# Patient Record
Sex: MALE | Race: WHITE
[De-identification: names, ages, dates, MRNs, and addresses within clinical notes are randomized per-mention and may not be internally consistent; named-entity substitution may affect disease eponyms.]

---

## 2018-09-03 ENCOUNTER — HOSPITAL ENCOUNTER (EMERGENCY)
Dept: HOSPITAL 97 - ER | Age: 18
Discharge: HOME | End: 2018-09-03
Payer: SELF-PAY
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
## 2018-09-03 DIAGNOSIS — R07.89: Primary | ICD-10-CM

## 2018-09-03 DIAGNOSIS — Q61.3: ICD-10-CM
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
## 2018-09-03 DIAGNOSIS — S20.212A: ICD-10-CM

## 2018-09-03 LAB
ALBUMIN SERPL BCP-MCNC: 4.2 G/DL (ref 3.4–5)
ALP SERPL-CCNC: 74 U/L (ref 45–117)
ALT SERPL W P-5'-P-CCNC: 21 U/L (ref 12–78)
AST SERPL W P-5'-P-CCNC: 14 U/L (ref 15–37)
BUN BLD-MCNC: 15 MG/DL (ref 7–18)
GLUCOSE SERPLBLD-MCNC: 99 MG/DL (ref 74–106)
HCT VFR BLD CALC: 46.4 % (ref 39.6–49)
LIPASE SERPL-CCNC: 103 U/L (ref 73–393)
LYMPHOCYTES # SPEC AUTO: 2.4 K/UL (ref 0.7–4.9)
MCH RBC QN AUTO: 29 PG (ref 27–35)
MCV RBC: 84.9 FL (ref 80–100)
METHAMPHET UR QL SCN: NEGATIVE
PMV BLD: 11.2 FL (ref 7.6–11.3)
POTASSIUM SERPL-SCNC: 3.5 MMOL/L (ref 3.5–5.1)
RBC # BLD: 5.47 M/UL (ref 4.33–5.43)
THC SERPL-MCNC: NEGATIVE NG/ML

## 2018-09-03 PROCEDURE — 83690 ASSAY OF LIPASE: CPT

## 2018-09-03 PROCEDURE — 99291 CRITICAL CARE FIRST HOUR: CPT

## 2018-09-03 PROCEDURE — 96374 THER/PROPH/DIAG INJ IV PUSH: CPT

## 2018-09-03 PROCEDURE — 80048 BASIC METABOLIC PNL TOTAL CA: CPT

## 2018-09-03 PROCEDURE — 36415 COLL VENOUS BLD VENIPUNCTURE: CPT

## 2018-09-03 PROCEDURE — 80320 DRUG SCREEN QUANTALCOHOLS: CPT

## 2018-09-03 PROCEDURE — 72125 CT NECK SPINE W/O DYE: CPT

## 2018-09-03 PROCEDURE — 86900 BLOOD TYPING SEROLOGIC ABO: CPT

## 2018-09-03 PROCEDURE — 80307 DRUG TEST PRSMV CHEM ANLYZR: CPT

## 2018-09-03 PROCEDURE — 96361 HYDRATE IV INFUSION ADD-ON: CPT

## 2018-09-03 PROCEDURE — 80076 HEPATIC FUNCTION PANEL: CPT

## 2018-09-03 PROCEDURE — 96375 TX/PRO/DX INJ NEW DRUG ADDON: CPT

## 2018-09-03 PROCEDURE — 74177 CT ABD & PELVIS W/CONTRAST: CPT

## 2018-09-03 PROCEDURE — 86850 RBC ANTIBODY SCREEN: CPT

## 2018-09-03 PROCEDURE — 71260 CT THORAX DX C+: CPT

## 2018-09-03 PROCEDURE — 81003 URINALYSIS AUTO W/O SCOPE: CPT

## 2018-09-03 PROCEDURE — 86901 BLOOD TYPING SEROLOGIC RH(D): CPT

## 2018-09-03 PROCEDURE — 99292 CRITICAL CARE ADDL 30 MIN: CPT

## 2018-09-03 PROCEDURE — 85025 COMPLETE CBC W/AUTO DIFF WBC: CPT

## 2018-09-03 PROCEDURE — 70450 CT HEAD/BRAIN W/O DYE: CPT
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
## 2019-03-07 ENCOUNTER — HOSPITAL ENCOUNTER (EMERGENCY)
Dept: HOSPITAL 97 - ER | Age: 19
Discharge: HOME | End: 2019-03-07
Payer: SELF-PAY

## 2019-03-07 DIAGNOSIS — I10: ICD-10-CM

## 2019-03-07 DIAGNOSIS — S39.92XA: Primary | ICD-10-CM
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
## 2019-03-07 LAB
BUN BLD-MCNC: 22 MG/DL (ref 7–18)
GLUCOSE SERPLBLD-MCNC: 92 MG/DL (ref 74–106)
HCT VFR BLD CALC: 46 % (ref 39.6–49)
LYMPHOCYTES # SPEC AUTO: 2.4 K/UL (ref 0.7–4.9)
PMV BLD: 11.5 FL (ref 7.6–11.3)
POTASSIUM SERPL-SCNC: 3.6 MMOL/L (ref 3.5–5.1)
RBC # BLD: 5.33 M/UL (ref 4.33–5.43)

## 2019-03-07 PROCEDURE — 80048 BASIC METABOLIC PNL TOTAL CA: CPT

## 2019-03-07 PROCEDURE — 86901 BLOOD TYPING SEROLOGIC RH(D): CPT

## 2019-03-07 PROCEDURE — 99284 EMERGENCY DEPT VISIT MOD MDM: CPT

## 2019-03-07 PROCEDURE — 85025 COMPLETE CBC W/AUTO DIFF WBC: CPT

## 2019-03-07 PROCEDURE — 96374 THER/PROPH/DIAG INJ IV PUSH: CPT

## 2019-03-07 PROCEDURE — 72125 CT NECK SPINE W/O DYE: CPT

## 2019-03-07 PROCEDURE — 71260 CT THORAX DX C+: CPT

## 2019-03-07 PROCEDURE — 36415 COLL VENOUS BLD VENIPUNCTURE: CPT

## 2019-03-07 PROCEDURE — 74177 CT ABD & PELVIS W/CONTRAST: CPT

## 2019-03-07 PROCEDURE — 86900 BLOOD TYPING SEROLOGIC ABO: CPT

## 2019-03-07 PROCEDURE — 70450 CT HEAD/BRAIN W/O DYE: CPT

## 2019-03-07 PROCEDURE — 86850 RBC ANTIBODY SCREEN: CPT
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
## 2019-09-02 ENCOUNTER — HOSPITAL ENCOUNTER (EMERGENCY)
Dept: HOSPITAL 97 - ER | Age: 19
Discharge: HOME | End: 2019-09-02
Payer: SELF-PAY
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
## 2019-09-02 DIAGNOSIS — I10: ICD-10-CM

## 2019-09-02 DIAGNOSIS — S62.356A: Primary | ICD-10-CM
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
## 2019-09-02 PROCEDURE — 2W3JX1Z IMMOBILIZATION OF RIGHT FINGER USING SPLINT: ICD-10-PCS

## 2019-09-02 PROCEDURE — 99283 EMERGENCY DEPT VISIT LOW MDM: CPT

## 2019-09-02 NOTE — ER
Nurse's Notes                                                                                     

 [facility]                                                                 

Name: Bryson Kumar                                                                                

Age: 19 yrs                                                                                       

Sex: Male                                                                                         

: 2000                                                                                   

MRN: [id number]                                                                                   

Arrival Date: 2019                                                                          

Time: 17:48                                                                                       

Account#: [id number]                                                                            

Bed 6                                                                                             

Private MD:                                                                                       

Diagnosis: Nondisplaced fracture of shaft of fifth metacarpal bone, right hand                    

                                                                                                  

Presentation:                                                                                     

                                                                                             

17:49 Presenting complaint: Patient states: right hand injury yesterday after punching a      sv  

      wall. Transition of care: patient was not received from another setting of care. Onset      

      of symptoms was 2019. Risk Assessment: Do you want to hurt yourself or         

      someone else? Patient reports no desire to harm self or others. Initial Sepsis Screen:      

      Does the patient meet any 2 criteria? No. Patient's initial sepsis screen is negative.      

      Does the patient have a suspected source of infection? No. Patient's initial sepsis         

      screen is negative. Care prior to arrival: None.                                            

17:49 Method Of Arrival: Ambulatory                                                           sv  

17:49 Acuity: MINERVA 4                                                                           sv  

                                                                                                  

Triage Assessment:                                                                                

17:49 General: Appears in no apparent distress. uncomfortable, Behavior is calm, cooperative, sv  

      appropriate for age. Pain: Complains of pain in right hand. Neuro: Level of                 

      Consciousness is awake, alert, obeys commands, Oriented to person, place, time,             

      situation, Gait is steady. Respiratory: Respiratory effort is even, unlabored.              

      Musculoskeletal: Range of motion: intact in all extremities.                                

18:00 Injury Description: Bruise sustained to right hand.                                     iw  

                                                                                                  

Historical:                                                                                       

- Allergies:                                                                                      

17:50 No Known Drug Allergies;                                                                sv  

- PMHx:                                                                                           

17:50 Hypertension; POLYCYSTIC KIDNEY DISEASE;                                                sv  

- PSHx:                                                                                           

17:50 None;                                                                                   sv  

                                                                                                  

- Immunization history:: Adult Immunizations up to date.                                          

- Social history:: Smoking status: Patient/guardian denies using tobacco.                         

- Ebola Screening: : No symptoms or risks identified at this time.                                

                                                                                                  

                                                                                                  

Screenin:00 Abuse screen: Denies threats or abuse. Denies injuries from another. Nutritional        iw  

      screening: No deficits noted. Tuberculosis screening: No symptoms or risk factors           

      identified. Fall Risk None identified.                                                      

                                                                                                  

Assessment:                                                                                       

18:00 General: Appears in no apparent distress. Behavior is calm, cooperative. Pain:          iw  

      Complains of pain in right hand. Neuro: Level of Consciousness is awake, alert, obeys       

      commands, Oriented to person, place, time, situation, Moves all extremities.                

      Cardiovascular: Patient's skin is warm and dry. Respiratory: Respiratory effort is          

      even, unlabored, Respiratory pattern is regular. Derm: Skin is intact, is healthy with      

      good turgor. Musculoskeletal: Range of motion: limited in right hand.                       

                                                                                                  

Vital Signs:                                                                                      

17:50  / 77; Pulse 98; Resp 16; Temp 98.8; Pulse Ox 98% ; Weight 104.33 kg; Height 6    sv  

      ft. 3 in. (190.50 cm); Pain 8/10;                                                           

17:50 Body Mass Index 28.75 (104.33 kg, 190.50 cm)                                            sv  

                                                                                                  

ED Course:                                                                                        

17:48 Patient arrived in ED.                                                                  as  

17:49 Triage completed.                                                                       sv  

17:50 Arm band placed on.                                                                     sv  

17:56 Uriel Parr PA is PHCP.                                                               [id number] 

17:57 Justin Pate MD is Attending Physician.                                             [id number] 

17:59 Yaneth Henao, RN is Primary Nurse.                                                   iw  

18:00 Patient has correct armband on for positive identification.                             iw  

18:10 Hand Right 3 View XRAY In Process Unspecified.                                          EDMS

18:36 Johnson Gutierrez MD is Referral Physician.                                                [id number] 

18:38 Orthoglass splint: Ulnar gutter/Boxer splint applied on right forearm.                  em1 

18:55 No provider procedures requiring assistance completed. Patient did not have IV access   iw  

      during this emergency room visit.                                                           

                                                                                                  

Administered Medications:                                                                         

No medications were administered                                                                  

                                                                                                  

                                                                                                  

Outcome:                                                                                          

18:38 Discharge ordered by MD.                                                                [id number] 

18:55 Discharged to home ambulatory.                                                          iw  

18:55 Condition: good                                                                             

18:55 Discharge instructions given to patient, Instructed on discharge instructions, follow       

      up and referral plans. medication usage, Demonstrated understanding of instructions,        

      follow-up care, medications, Prescriptions given X 1.                                       

18:56 Patient left the ED.                                                                    iw  

                                                                                                  

Signatures:                                                                                       

Dispatcher [employer]                           EDMS                                                 

Reyna Goncalves RN RN sv Martinez, Amelia as Williams, Irene, RN RN                                                      

Abelino Slaughter                               em1                                                  

Uriel Parr PA                        PA   [id number]                                                  

                                                                                                  

**************************************************************************************************

## 2019-09-02 NOTE — RAD REPORT
EXAM DESCRIPTION:  RAD - Hand Right 3 View - 9/2/2019 6:09 pm

 

CLINICAL HISTORY:  Right hand pain following blunt force trauma

 

COMPARISON:  None.

 

FINDINGS:  Transverse fractures present midshaft fifth metacarpal. There is no distraction. Patient d
oes show 25 degrees ventral angulation deformity.

 

 No other fracture changes seen. No dislocation or periosteal reaction. Soft tissue swelling is prese
nt without foreign body.

 

IMPRESSION:  Right fifth metacarpal midshaft fracture with 25 degrees ventral angulation.

## 2019-09-02 NOTE — EDPHYS
Physician Documentation                                                                           

 [facility]                                                                 

Name: Bryson Kumar                                                                                

Age: 19 yrs                                                                                       

Sex: Male                                                                                         

: 2000                                                                                   

MRN: [id number]                                                                                   

Arrival Date: 2019                                                                          

Time: 17:48                                                                                       

Account#: [id number]                                                                            

Bed 6                                                                                             

Private MD:                                                                                       

ED Physician Justin Pate                                                                      

HPI:                                                                                              

                                                                                             

18:28 This 19 yrs old  Male presents to ER via Ambulatory with complaints of Hand    jr8 

      Injury.                                                                                     

18:28 The patient or guardian reports decreased range of motion, pain, swelling, tenderness.  jr8 

      The complaints affect the right 5th metacarpal region. Context: The problem was             

      sustained at home, resulted from using own fist to strike, a wall. Onset: The               

      symptoms/episode began/occurred acutely, today. Modifying factors: The symptoms are         

      alleviated by nothing, the symptoms are aggravated by movement. Associated signs and        

      symptoms: The patient has no apparent associated signs or symptoms. Severity of             

      symptoms: At their worst the symptoms were mild, in the emergency department the            

      symptoms are unchanged. The patient has not experienced similar symptoms in the past.       

      The patient has not recently seen a physician.                                              

                                                                                                  

Historical:                                                                                       

- Allergies:                                                                                      

17:50 No Known Drug Allergies;                                                                sv  

- PMHx:                                                                                           

17:50 Hypertension; POLYCYSTIC KIDNEY DISEASE;                                                sv  

- PSHx:                                                                                           

17:50 None;                                                                                   sv  

                                                                                                  

- Immunization history:: Adult Immunizations up to date.                                          

- Social history:: Smoking status: Patient/guardian denies using tobacco.                         

- Ebola Screening: : No symptoms or risks identified at this time.                                

                                                                                                  

                                                                                                  

ROS:                                                                                              

18:28 Eyes: Negative for injury, pain, redness, and discharge, ENT: Negative for injury,      jr8 

      pain, and discharge, Neck: Negative for injury, pain, and swelling, Cardiovascular:         

      Negative for chest pain, palpitations, and edema, Respiratory: Negative for shortness       

      of breath, cough, wheezing, and pleuritic chest pain, Abdomen/GI: Negative for              

      abdominal pain, nausea, vomiting, diarrhea, and constipation, Back: Negative for injury     

      and pain, Skin: Negative for injury, rash, and discoloration, Neuro: Negative for           

      headache, weakness, numbness, tingling, and seizure.                                        

18:28 MS/extremity: Positive for decreased range of motion, ecchymosis, pain, swelling,           

      tenderness, of the right hand.                                                              

                                                                                                  

Exam:                                                                                             

18:28 Eyes:  Pupils equal round and reactive to light, extra-ocular motions intact.  Lids and jr8 

      lashes normal.  Conjunctiva and sclera are non-icteric and not injected.  Cornea within     

      normal limits.  Periorbital areas with no swelling, redness, or edema. ENT:  Nares          

      patent. No nasal discharge, no septal abnormalities noted.  Tympanic membranes are          

      normal and external auditory canals are clear.  Oropharynx with no redness, swelling,       

      or masses, exudates, or evidence of obstruction, uvula midline.  Mucous membranes           

      moist. Neck:  Trachea midline, no thyromegaly or masses palpated, and no cervical           

      lymphadenopathy.  Supple, full range of motion without nuchal rigidity, or vertebral        

      point tenderness.  No Meningismus. Cardiovascular:  Regular rate and rhythm with a          

      normal S1 and S2.  No gallops, murmurs, or rubs.  Normal PMI, no JVD.  No pulse             

      deficits. Respiratory:  Lungs have equal breath sounds bilaterally, clear to                

      auscultation and percussion.  No rales, rhonchi or wheezes noted.  No increased work of     

      breathing, no retractions or nasal flaring. Abdomen/GI:  Soft, non-tender, with normal      

      bowel sounds.  No distension or tympany.  No guarding or rebound.  No evidence of           

      tenderness throughout. Back:  No spinal tenderness.  No costovertebral tenderness.          

      Full range of motion. Skin:  Warm, dry with normal turgor.  Normal color with no            

      rashes, no lesions, and no evidence of cellulitis. Neuro:  Awake and alert, GCS 15,         

      oriented to person, place, time, and situation.  Cranial nerves II-XII grossly intact.      

      Motor strength 5/5 in all extremities.  Sensory grossly intact.  Cerebellar exam            

      normal.  Normal gait.                                                                       

18:28 Musculoskeletal/extremity: Extremities: grossly normal except: noted in the right hand:     

      Patient has swelling, tenderness, and hematoma formation noted over the MCP of the 4th      

      and 5th digits , ROM: intact in all extremities, full active range of motion, full          

      passive range of motion, limited active range of motion due to pain, limited passive        

      range of motion due to pain, Circulation is intact in all extremities. Sensation            

      intact.                                                                                     

                                                                                                  

Vital Signs:                                                                                      

17:50  / 77; Pulse 98; Resp 16; Temp 98.8; Pulse Ox 98% ; Weight 104.33 kg; Height 6    sv  

      ft. 3 in. (190.50 cm); Pain 8/10;                                                           

17:50 Body Mass Index 28.75 (104.33 kg, 190.50 cm)                                              

                                                                                                  

Procedures:                                                                                       

18:28 Splinting: Splint applied to right hand using Orthoglass splint, applied by tech.       jr8 

      Examined by me, post splint application: neurovascular intact, 2+ distal pulses             

      palpable, brisk capillary refill noted, Patient tolerated well.                             

                                                                                                  

MDM:                                                                                              

17:57 Patient medically screened.                                                             jr8 

18:28 Data reviewed: vital signs, nurses notes, radiologic studies, plain films. Data         jr8 

      interpreted: Pulse oximetry: on room air is 98 %. Interpretation: normal. Counseling: I     

      had a detailed discussion with the patient and/or guardian regarding: the historical        

      points, exam findings, and any diagnostic results supporting the discharge/admit            

      diagnosis, radiology results, the need for outpatient follow up, a orthopedic surgeon,      

      to return to the emergency department if symptoms worsen or persist or if there are any     

      questions or concerns that arise at home.                                                   

                                                                                                  

                                                                                             

17:52 Order name: Hand Right 3 View XRAY; Complete Time: 18:25                                  

                                                                                             

18:36 Order name: Splint; Complete Time: 18:38                                                jr8 

                                                                                                  

Administered Medications:                                                                         

No medications were administered                                                                  

                                                                                                  

                                                                                                  

Disposition:                                                                                      

                                                                                             

07:29 Co-signature as Attending Physician, Justin Pate MD I agree with the assessment and  robbie 

      plan of care.                                                                               

                                                                                                  

Disposition:                                                                                      

19 18:38 Discharged to Home. Impression: Nondisplaced fracture of shaft of fifth            

  metacarpal bone, right hand.                                                                    

- Condition is Stable.                                                                            

- Discharge Instructions: Metacarpal Fracture.                                                    

- Prescriptions for Tylenol- Codeine #3 300-30 mg Oral Tablet - take 2 tablets by ORAL            

  route every 6 hours As needed; 20 tablet.                                                       

- Medication Reconciliation Form, Thank You Letter, Antibiotic Education, Prescription            

  Opioid Use form.                                                                                

- Follow up: Johnson Gutierrez MD; When: 2 - 3 days; Reason: Recheck today's complaints,             

  Continuance of care, Re-evaluation by your physician.                                           

- Problem is new.                                                                                 

- Symptoms have improved.                                                                         

                                                                                                  

                                                                                                  

                                                                                                  

Signatures:                                                                                       

Dispatcher [employer]                           Reyna Singh, Justin Moon RN, MD MD cha Williams, Irene, RN RN iw Roszak, Josh, PA PA   jr8                                                  

                                                                                                  

Corrections: (The following items were deleted from the chart)                                    

                                                                                             

18:56 18:38 2019 18:38 Discharged to Home. Impression: Nondisplaced fracture of shaft   iw  

      of fifth metacarpal bone, right hand. Condition is Stable. Forms are Medication             

      Reconciliation Form, Thank You Letter, Antibiotic Education, Prescription Opioid Use.       

      Follow up: Dr. Johnson Gutierrez; When: 2 - 3 days; Reason: Recheck today's complaints,          

      Continuance of care, Re-evaluation by your physician. Problem is new. Symptoms have         

      improved. jr8                                                                               

                                                                                                  

**************************************************************************************************

## 2020-06-15 ENCOUNTER — HOSPITAL ENCOUNTER (EMERGENCY)
Dept: HOSPITAL 97 - ER | Age: 20
Discharge: HOME | End: 2020-06-15
Payer: COMMERCIAL

## 2020-06-15 VITALS — TEMPERATURE: 98.3 F | OXYGEN SATURATION: 98 % | DIASTOLIC BLOOD PRESSURE: 88 MMHG | SYSTOLIC BLOOD PRESSURE: 134 MMHG

## 2020-06-15 DIAGNOSIS — R51: Primary | ICD-10-CM

## 2020-06-15 DIAGNOSIS — Z88.6: ICD-10-CM

## 2020-06-15 DIAGNOSIS — I10: ICD-10-CM

## 2020-06-15 PROCEDURE — 96375 TX/PRO/DX INJ NEW DRUG ADDON: CPT

## 2020-06-15 PROCEDURE — 99283 EMERGENCY DEPT VISIT LOW MDM: CPT

## 2020-06-15 PROCEDURE — 96374 THER/PROPH/DIAG INJ IV PUSH: CPT

## 2020-06-15 NOTE — ER
Nurse's Notes                                                                                     

 [facility]                                                                 

Name: Bryson Kumar                                                                                

Age: 19 yrs                                                                                       

Sex: Male                                                                                         

: 2000                                                                                   

MRN: [id number]                                                                                   

Arrival Date: 06/15/2020                                                                          

Time: 09:09                                                                                       

Account#: [id number]                                                                            

Bed 23                                                                                            

Private MD:                                                                                       

Diagnosis: Headache                                                                               

                                                                                                  

Presentation:                                                                                     

06/15                                                                                             

09:22 Chief complaint: Patient states: was at work and had a headache and was sent home for   iw  

      the day and needs to be checked by a doctor before he can go back to work, pt c/o pain      

      to top of head since this morning, has hx of migraines and feels similar to the start       

      of a migraine, took tylenol this morning. Coronavirus screen: Proceed with normal           

      triage. Patient denies a cough. Patient denies shortness of breath or difficulty            

      breathing. Patient denies measured and/or subjective temperature greater than 100.4F        

      prior to today's visit. Patient denies travel on a cruise ship or to a country the [facility]      

      currently lists as an affected area. Patient denies contact with known and/or suspected     

      case of COVID-19. Ebola Screen: Patient negative for fever greater than or equal to         

      101.5 degrees Fahrenheit, and additional compatible Ebola Virus Disease symptoms            

      Patient denies exposure to infectious person. Patient denies travel to an                   

      Ebola-affected area in the 21 days before illness onset. No symptoms or risks               

      identified at this time. Initial Sepsis Screen: Does the patient meet any 2 criteria?       

      No. Patient's initial sepsis screen is negative. Does the patient have a suspected          

      source of infection? No. Patient's initial sepsis screen is negative. Risk Assessment:      

      Do you want to hurt yourself or someone else? Patient reports no desire to harm self or     

      others. Onset of symptoms was Gisela 15, 2020.                                                

09:22 Method Of Arrival: Ambulatory                                                           iw  

09:22 Acuity: MINERVA 4                                                                           iw  

09:56 Acuity: MINERVA 3                                                                           iw  

                                                                                                  

Triage Assessment:                                                                                

11:25 Headache History: The patient has had previous headaches and this one is similar to     iw  

      previous episodes. Pain: Also complains of no other associated symptoms.                    

11:25 Pain: Pain began 2 hours ago.                                                           iw  

                                                                                                  

Historical:                                                                                       

- Allergies:                                                                                      

09:25 Ibuprofen;                                                                              iw  

- Home Meds:                                                                                      

:25 None [Active];                                                                          iw  

- PMHx:                                                                                           

09:25 Hypertension; POLYCYSTIC KIDNEY DISEASE;                                                iw  

- PSHx:                                                                                           

09:25 None;                                                                                   iw  

                                                                                                  

- Immunization history:: Adult Immunizations.                                                     

- Social history:: Smoking status: Patient denies any tobacco usage or history of.                

                                                                                                  

                                                                                                  

Screenin:28 Abuse screen: Denies threats or abuse. Denies injuries from another. Nutritional        iw  

      screening: No deficits noted. Tuberculosis screening: No symptoms or risk factors           

      identified. Fall Risk None identified.                                                      

                                                                                                  

Assessment:                                                                                       

09:28 General: Appears in no apparent distress. comfortable, Behavior is calm, cooperative.   iw  

      Pain: Complains of pain in top of head Pain currently is 3 out of 10 on a pain scale.       

      Neuro: Level of Consciousness is awake, alert, obeys commands, Oriented to person,          

      place, time, situation, Moves all extremities. Full function. Neuro: Reports headache.      

      Cardiovascular: Patient's skin is warm and dry. Respiratory: Respiratory effort is          

      even, unlabored, Respiratory pattern is regular, symmetrical. GI: No signs and/or           

      symptoms were reported involving the gastrointestinal system. Derm: Skin is intact, is      

      healthy with good turgor. Musculoskeletal: Range of motion: intact in all extremities.      

11:00 Reassessment: Patient appears in no apparent distress at this time. Patient and/or      iw  

      family updated on plan of care and expected duration. Pain level reassessed. Patient is     

      alert, oriented x 3, equal unlabored respirations, skin warm/dry/pink.                      

                                                                                                  

Vital Signs:                                                                                      

09:22  / 88; Pulse 65; Resp 16; Temp 98.3; Pulse Ox 98% on R/A; Weight 108.86 kg;       iw  

      Height 6 ft. 2 in. (187.96 cm); Pain 3/10;                                                  

09:22 Body Mass Index 30.81 (108.86 kg, 187.96 cm)                                            iw  

                                                                                                  

ED Course:                                                                                        

09:09 Patient arrived in ED.                                                                  mr  

09:22 Jacob Kent PA is PHCP.                                                              jmm 

09:22 Isidoro Vargas MD is Attending Physician.                                                jmm 

09:24 Triage completed.                                                                       iw  

09:28 Arm band placed on.                                                                     iw  

09:28 Patient has correct armband on for positive identification.                             iw  

09:28 No provider procedures requiring assistance completed.                                  iw  

09:29 Yaneth Henao, RN is Primary Nurse.                                                   iw  

09:50 Inserted saline lock: 20 gauge in right antecubital area, using aseptic technique.      iw  

11:25 IV discontinued, intact, bleeding controlled, No redness/swelling at site. Pressure     iw  

      dressing applied.                                                                           

                                                                                                  

Administered Medications:                                                                         

 Drug: NS 0.9% 250 ml Route: IV; Rate: bolus; Site: right antecubital;                   iw  

:56 Drug: Reglan 10 mg Route: IVP; Site: right antecubital;                                 iw  

:56 Drug: diphenhydrAMINE 12.5 mg Route: IVP; Site: right antecubital;                      iw  

11:25 Not Given (Patient Refused): Decadron - Dexamethasone 10 mg IVP once                    iw  

                                                                                                  

                                                                                                  

Outcome:                                                                                          

10:58 Discharge ordered by MD. orourke 

11:25 Discharged to home ambulatory.                                                          iw  

11:25 Condition: good                                                                             

11:25 Discharge instructions given to patient, Instructed on discharge instructions, follow       

      up and referral plans. Demonstrated understanding of instructions, follow-up care.          

11:26 Patient left the ED.                                                                    iw  

                                                                                                  

Signatures:                                                                                       

Jacob Kent PA PA jmm Rivera, Mary                                 mr                                                   

Yaneth Henao, RN                     RN   iw                                                   

                                                                                                  

**************************************************************************************************

## 2020-06-15 NOTE — EDPHYS
Physician Documentation                                                                           

 [facility]                                                                 

Name: Bryson Kumar                                                                                

Age: 19 yrs                                                                                       

Sex: Male                                                                                         

: 2000                                                                                   

MRN: [id number]                                                                                   

Arrival Date: 06/15/2020                                                                          

Time: 09:09                                                                                       

Account#: [id number]                                                                            

Bed 23                                                                                            

Private MD:                                                                                       

ED Physician Isidoro Vargas                                                                         

HPI:                                                                                              

06/15                                                                                             

09:31 This 19 yrs old  Male presents to ER via Ambulatory with complaints of         jmm 

      Headache.                                                                                   

09:31 The patient complains of pain to the top of head. Onset: The symptoms/episode           jmm 

      began/occurred gradually, this morning. Associated signs and symptoms: Pertinent            

      positives: nausea, Photophobia. Headache History: The patient has had previous              

      headaches and this one is similar to previous episodes. The symptoms are alleviated by      

      nothing. the symptoms are aggravated by lights. This is a 19 year old male with a           

      history of PCKD, HTN that presents to the ED with complaints of migraine headache           

      beginning this morning. Similar to previous headaches. Denies vomiting but has nausea. .    

                                                                                                  

Historical:                                                                                       

- Allergies:                                                                                      

09:25 Ibuprofen;                                                                              iw  

- Home Meds:                                                                                      

:25 None [Active];                                                                          iw  

- PMHx:                                                                                           

09:25 Hypertension; POLYCYSTIC KIDNEY DISEASE;                                                iw  

- PSHx:                                                                                           

09:25 None;                                                                                   iw  

                                                                                                  

- Immunization history:: Adult Immunizations.                                                     

- Social history:: Smoking status: Patient denies any tobacco usage or history of.                

                                                                                                  

                                                                                                  

ROS:                                                                                              

09:31 Constitutional: Negative for fever, chills, and weight loss, Cardiovascular: Negative   jmm 

      for chest pain, palpitations, and edema, Respiratory: Negative for shortness of breath,     

      cough, wheezing, and pleuritic chest pain.                                                  

09:31 Neuro: Positive for headache.                                                               

09:31 All other systems are negative.                                                             

                                                                                                  

Exam:                                                                                             

09:31 Constitutional:  This is a well developed, well nourished patient who is awake, alert,  jmm 

      and in no acute distress. Head/Face:  atraumatic. Eyes:  EOMI, no conjunctival erythema     

      appreciated ENT:  Moist Mucus Membranes Neck:  Trachea midline, Supple Chest/axilla:        

      Normal chest wall appearance and motion.   Cardiovascular:  Regular rate and rhythm.        

      No edema appreciated Respiratory:  Normal respirations, no respiratory distress             

      appreciated Abdomen/GI:  Non distended, soft Back:  Normal ROM Skin:  General               

      appearance color normal MS/ Extremity:  Moves all extremities, no obvious deformities       

      appreciated, no edema noted to the lower extremities  Neuro:  Awake and alert, normal       

      gait Psych:  Behavior is normal, Mood is normal, Patient is cooperative and pleasant        

                                                                                                  

Vital Signs:                                                                                      

09:22  / 88; Pulse 65; Resp 16; Temp 98.3; Pulse Ox 98% on R/A; Weight 108.86 kg;       iw  

      Height 6 ft. 2 in. (187.96 cm); Pain 3/10;                                                  

09:22 Body Mass Index 30.81 (108.86 kg, 187.96 cm)                                            iw  

                                                                                                  

MDM:                                                                                              

09:31 Patient medically screened.                                                             [facility] 

10:57 Data reviewed: vital signs, nurses notes. Counseling: I had a detailed discussion with  sharad 

      the patient and/or guardian regarding: the historical points, exam findings, and any        

      diagnostic results supporting the discharge/admit diagnosis, the need for outpatient        

      follow up, to return to the emergency department if symptoms worsen or persist or if        

      there are any questions or concerns that arise at home. ED course: HA similar in            

      character to previous episodes. I do not suspect SAH or meningitis. Patient is advised      

      to follow up with pcp and otherwise given strict return precautions. Patient understood     

      and agrees with the plan of care. .                                                         

                                                                                                  

Administered Medications:                                                                         

09:56 Drug: NS 0.9% 250 ml Route: IV; Rate: bolus; Site: right antecubital;                   iw  

09:56 Drug: Reglan 10 mg Route: IVP; Site: right antecubital;                                 iw  

09:56 Drug: diphenhydrAMINE 12.5 mg Route: IVP; Site: right antecubital;                      iw  

11:25 Not Given (Patient Refused): Decadron - Dexamethasone 10 mg IVP once                    iw  

                                                                                                  

                                                                                                  

Disposition:                                                                                      

14:17 Co-signature as Attending Physician, Isidoro Vargas MD.                                    rn  

                                                                                                  

Disposition:                                                                                      

06/15/20 10:58 Discharged to Home. Impression: Headache.                                          

- Condition is Stable.                                                                            

- Discharge Instructions: General Headache Without Cause.                                         

                                                                                                  

- Work release form, Medication Reconciliation Form, Thank You Letter, Antibiotic                 

  Education, Prescription Opioid Use form.                                                        

- Follow up: Private Physician; When: 2 - 3 days; Reason: Recheck today's complaints,             

  Continuance of care, Re-evaluation by your physician.                                           

                                                                                                  

                                                                                                  

                                                                                                  

Signatures:                                                                                       

Jacob Kent PA PA jmm Williams, Irene, RN                     RN   iw                                                   

Isidoro Vargas MD MD   rn                                                   

                                                                                                  

Corrections: (The following items were deleted from the chart)                                    

11:26 10:58 06/15/2020 10:58 Discharged to Home. Impression: Headache. Condition is Stable.   iw  

      Forms are Medication Reconciliation Form, Thank You Letter, Antibiotic Education,           

      Prescription Opioid Use. Follow up: Private Physician; When: 2 - 3 days; Reason:            

      Recheck today's complaints, Continuance of care, Re-evaluation by your physician. sharad       

                                                                                                  

**************************************************************************************************

## 2020-11-06 ENCOUNTER — HOSPITAL ENCOUNTER (EMERGENCY)
Dept: HOSPITAL 97 - ER | Age: 20
Discharge: HOME | End: 2020-11-06
Payer: COMMERCIAL

## 2020-11-06 VITALS — SYSTOLIC BLOOD PRESSURE: 137 MMHG | OXYGEN SATURATION: 99 % | TEMPERATURE: 97.8 F | DIASTOLIC BLOOD PRESSURE: 67 MMHG
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
## 2020-11-06 DIAGNOSIS — I10: ICD-10-CM

## 2020-11-06 DIAGNOSIS — S00.90XA: Primary | ICD-10-CM

## 2020-11-06 PROCEDURE — 99283 EMERGENCY DEPT VISIT LOW MDM: CPT

## 2020-11-06 PROCEDURE — 70450 CT HEAD/BRAIN W/O DYE: CPT

## 2020-11-06 NOTE — XMS REPORT
Continuity of Care Document

                           Created on:2020



Patient:JANSKY, AARON DUANE

Sex:Male

:2000

External Reference #:[id number]





Demographics







                          Address                   [address] 



                                                    [place] [address]

 

                          Home Phone                [phone]

 

                          Work Phone                [phone]

 

                          Email Address             [email or web address]

 

                          Preferred Language        English

 

                          Marital Status            Unknown

 

                          Ad[employer] Affiliation     Unknown

 

                          Race                      Unknown

 

                          Additional Race(s)        Unavailable



                                                    White

 

                          Ethnic Group              Unknown









Author







                          Organization              [facility]

t

 

                          Address                   [address]



                                                    [place] [address]

 

                          Phone                     [phone]









Care Team Providers







                    Name                Role                Phone

 

                    DION Escamilla       Attending Clinician [phone]

 

                    Lab,  Rashard Baeza I     Attending Clinician Unavailable









Problems

This patient has no known problems.



Allergies, Adverse Reactions, Alerts

This patient has no known allergies or adverse reactions.



Medications

This patient has no known medications.



Procedures

This patient has no known procedures.



Encounters







        Start   End     Encounter Admission Attending Care    Care    Encounter 

Source



        Date/Time Date/Time Type    Type    Clinicians Facility Department ID   

   

 

        2020 Telephone         MONISHA Lewis    [phone]

[id number] 



        00:00:00 00:00:00                 Grisel SIERRA   [id number]         



                                                [facility] [id number]         



                                                        [phone]         



                                                        019             

 

        2020 Laboratory         Lab, [facility]    [id number] [id number]

[id number] 



        09:36:12 09:44:34 Only            [facility]am [facility]  [id number]         



                                                [place] [id number]         



                                                [facility] [phone]         



                                                [place] [address]             



                                                Office                  



                                                Building                 



                                                One                     







Results

This patient has no known results.
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
## 2020-11-06 NOTE — EDPHYS
Physician Documentation                                                                           

 [facility]                                                                 

Name: Bryson Kumar                                                                                

Age: 20 yrs                                                                                       

Sex: Male                                                                                         

: 2000                                                                                   

MRN: [id number]                                                                                   

Arrival Date: 2020                                                                          

Time: 12:10                                                                                       

Account#: [id number]                                                                            

Bed 7                                                                                             

Private MD:                                                                                       

ED Physician Marty Grier                                                                       

Historical:                                                                                       

- Allergies:                                                                                      

                                                                                             

12:18 None;                                                                                   ss  

- Home Meds:                                                                                      

12:18 None [Active];                                                                          ss  

- PMHx:                                                                                           

12:18 POLYCYSTIC KIDNEY DISEASE; Hypertension;                                                ss  

- PSHx:                                                                                           

12:18 None;                                                                                   ss  

                                                                                                  

- Immunization history:: Adult Immunizations up to date.                                          

- Social history:: Smoking status: Patient denies any tobacco usage or history of.                

                                                                                                  

                                                                                                  

Vital Signs:                                                                                      

12:16  / 67; Pulse 64; Resp 16; Temp 97.8(TE); Pulse Ox 99% on R/A; Weight 104.33 kg;   ss  

      Height 6 ft. 2 in. (187.96 cm); Pain 8/10;                                                  

12:16 Body Mass Index 29.53 (104.33 kg, 187.96 cm)                                            ss  

                                                                                                  

MDM:                                                                                              

13:29 Patient medically screened.                                                             kdr 

                                                                                                  

                                                                                             

12:25 Order name: CT Head Brain wo Cont; Complete Time: 13:26                                 ss  

                                                                                                  

Administered Medications:                                                                         

No medications were administered                                                                  

                                                                                                  

                                                                                                  

Disposition:                                                                                      

20 13:29 Discharged to Home. Impression: Superficial injury of head.                        

- Condition is Stable.                                                                            

- Discharge Instructions: Concussion, Adult, Easy-to-Read, Head Injury, Adult,                    

  Easy-to-Read.                                                                                   

- Prescriptions for Tramadol 50 mg Oral Tablet - take 1 tablet by ORAL route every 8              

  hours as needed; 12 tablet.                                                                     

- Medication Reconciliation Form, Thank You Letter, Prescription Opioid Use, Work                 

  release form form.                                                                              

- Follow up: Private Physician; When: 2 - 3 days; Reason: If symptoms return, Further             

  diagnostic work-up, Recheck today's complaints, Continuance of care, Re-evaluation by           

  your physician.                                                                                 

- Problem is new.                                                                                 

- Symptoms are unchanged.                                                                         

                                                                                                  

                                                                                                  

                                                                                                  

Signatures:                                                                                       

Dispatcher [employer]                           EDMS                                                 

Marty Grier MD MD kdr Smirch, Shelby, RN RN   ss                                                   

Tawnya Hogue RN                     RN   hb                                                   

                                                                                                  

Corrections: (The following items were deleted from the chart)                                    

13:52 13:29 2020 13:29 Discharged to Home. Impression: Superficial injury of head.      hb  

      Condition is Stable. Forms are Medication Reconciliation Form, Thank You Letter,            

      Antibiotic Education, Prescription Opioid Use. Follow up: Private Physician; When: 2 -      

      3 days; Reason: If symptoms return, Further diagnostic work-up, Recheck today's             

      complaints, Continuance of care, Re-evaluation by your physician. Problem is new.           

      Symptoms are unchanged. kdr                                                                 

                                                                                                  

**************************************************************************************************
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
